# Patient Record
Sex: FEMALE | Race: BLACK OR AFRICAN AMERICAN | NOT HISPANIC OR LATINO | URBAN - METROPOLITAN AREA
[De-identification: names, ages, dates, MRNs, and addresses within clinical notes are randomized per-mention and may not be internally consistent; named-entity substitution may affect disease eponyms.]

---

## 2017-07-19 ENCOUNTER — EMERGENCY (EMERGENCY)
Facility: HOSPITAL | Age: 32
LOS: 1 days | Discharge: HOME | End: 2017-07-19
Admitting: EMERGENCY MEDICINE
Payer: MEDICAID

## 2017-07-19 VITALS
DIASTOLIC BLOOD PRESSURE: 80 MMHG | SYSTOLIC BLOOD PRESSURE: 132 MMHG | HEART RATE: 87 BPM | RESPIRATION RATE: 16 BRPM | OXYGEN SATURATION: 100 % | TEMPERATURE: 98 F

## 2017-07-19 VITALS
RESPIRATION RATE: 18 BRPM | SYSTOLIC BLOOD PRESSURE: 141 MMHG | TEMPERATURE: 98 F | HEART RATE: 73 BPM | DIASTOLIC BLOOD PRESSURE: 84 MMHG | OXYGEN SATURATION: 99 %

## 2017-07-19 PROCEDURE — 93010 ELECTROCARDIOGRAM REPORT: CPT

## 2017-07-19 PROCEDURE — 99284 EMERGENCY DEPT VISIT MOD MDM: CPT | Mod: 25

## 2017-07-19 RX ORDER — OXYMETAZOLINE HYDROCHLORIDE 0.5 MG/ML
3 SPRAY NASAL ONCE
Qty: 0 | Refills: 0 | Status: COMPLETED | OUTPATIENT
Start: 2017-07-19 | End: 2017-07-19

## 2017-07-19 RX ORDER — PSEUDOEPHEDRINE HCL 30 MG
1 TABLET ORAL
Qty: 28 | Refills: 0 | OUTPATIENT
Start: 2017-07-19 | End: 2017-07-26

## 2017-07-19 RX ORDER — PSEUDOEPHEDRINE HCL 30 MG
30 TABLET ORAL ONCE
Qty: 0 | Refills: 0 | Status: COMPLETED | OUTPATIENT
Start: 2017-07-19 | End: 2017-07-19

## 2017-07-19 RX ADMIN — Medication 30 MILLIGRAM(S): at 17:46

## 2017-07-19 RX ADMIN — OXYMETAZOLINE HYDROCHLORIDE 3 SPRAY(S): 0.5 SPRAY NASAL at 17:46

## 2017-07-19 NOTE — ED PROVIDER NOTE - CARE PLAN
Principal Discharge DX:	Allergic sinusitis Principal Discharge DX:	Allergic sinusitis  Instructions for follow-up, activity and diet:	Follow up with your Primary Medical Doctor within 2-3days. FOLLOW up with allergist. If results or reports were given to you, show copies of your reports given to you. Use Afrin nasal spray for no more than 3 days. Take pseudoephedrine as prescribed.  Take all of your medications as previously prescribed. Principal Discharge DX:	Allergic sinusitis  Instructions for follow-up, activity and diet:	Follow up with your Primary Medical Doctor within 2-3days. FOLLOW up with allergist. If results or reports were given to you, show copies of your reports given to you. Use Afrin nasal spray for no more than 3 days. Take pseudoephedrine as prescribed.  Take all of your medications as previously prescribed. If worsening symptoms, loss of vision, fever, headache, chest pain or shortness of breath return to the ED.

## 2017-07-19 NOTE — ED PROVIDER NOTE - OBJECTIVE STATEMENT
32 yo female with PMHx of HTN, anemia, seasonal allergies presents to the ED c/o sinus pressure and pain that is putting pressure on her eyes. Pt has seen her PMD was given Claritin, and flonase which has not helped. Pt also c/o intermittent mild shoulder pain, when she moves her shoulder/arm the wrong way. Denies ha, blurry vision, loss of vision, sob, cp, fever, chills, nausea, vomiting, abdominal pain, 32 yo female with PMHx of HTN, anemia, seasonal allergies presents to the ED c/o sinus pressure and pain that is putting pressure on her eyes. Pt has seen her PMD was given Claritin, and flonase which has not helped. Pt also c/o intermittent mild shoulder pain, when she moves her shoulder/arm the wrong way, no current shoudler pain, has not tried taking NSAIDs for pain. Denies ha, blurry vision, loss of vision, sob, cp, fever, chills, nausea, vomiting, abdominal pain, 32 yo female with PMHx of HTN, anemia, seasonal allergies presents to the ED c/o sinus pressure and pain that is putting pressure on her eyes x 2-3 weeks. Pt has seen her PMD was given Claritin, and flonase which has not helped. Pt also c/o intermittent mild shoulder pain, when she moves her shoulder/arm the wrong way, no current shoudler pain, has not tried taking NSAIDs for pain. Denies ha, blurry vision, loss of vision, sob, cp, fever, chills, nausea, vomiting, abdominal pain,

## 2017-07-19 NOTE — ED ADULT TRIAGE NOTE - CHIEF COMPLAINT QUOTE
states" I am having pain to my left shoulder and to left arm pit and by shoulder blade since almost 1 week , with pain and pressure to left eye

## 2017-07-19 NOTE — ED PROVIDER NOTE - CHPI ED SYMPTOMS NEG
no blurred vision/no weakness/no numbness/no syncope/no nausea/no loss of consciousness/no chills/no vomiting/no fever

## 2017-07-19 NOTE — ED PROVIDER NOTE - PROGRESS NOTE DETAILS
CORTEZ Monique: Pt feeling better with medications . Will discharge home with pmd and allergist follow up.

## 2018-12-21 NOTE — ED PROVIDER NOTE - MEDICAL DECISION MAKING DETAILS
Patient has difficulty to fall asleep at night.  She stated that she sometimes not able to fall asleep after lying in bed for 2-3 hours.  Patient did not want to take prescription medication.  She preferred to try to sleep naturally.  However she has difficulty to train her mind to calm down and sleep if she works overtime.  Patient requests to have [facility] letter for her anxiety and insomnia.  
Patient presented to clinic for discussion of her anxiety and sleep problems.  Patient reported that she has generalized anxiety disorder with worries all the time.  She is not able to sleep at night due to her over thinking and worry.  She has that problem over a year.  She does not want to take medication for her anxiety and insomnia as she wants to try naturally to control her mood.  She never has history of depression or bipolar or panic attack.  She denies suicidal ideation or plan or homicidal ideation or plan.  She stated that her previous PCP provides a work letter for her not to work overtime due to her anxiety and high stress.    Patient stated that she feels stressed if she works more than 8 hours a day and she has more anxiety attack if she works overtime.  She is not able to have enough rest and sleep if she works overtime.  Her body will be extremely tired and exhausted.  
30 yo female with sinus pressure, has had in the past due to allergies.   Likely allergic sinusitis. Will give afrin and pseudoephedrine and reassess. will send home with pmd follow up and allergist referral.

## 2023-10-21 NOTE — ED PROVIDER NOTE - PLAN OF CARE
Follow up with your Primary Medical Doctor within 2-3days. FOLLOW up with allergist. If results or reports were given to you, show copies of your reports given to you. Use Afrin nasal spray for no more than 3 days. Take pseudoephedrine as prescribed.  Take all of your medications as previously prescribed. Follow up with your Primary Medical Doctor within 2-3days. FOLLOW up with allergist. If results or reports were given to you, show copies of your reports given to you. Use Afrin nasal spray for no more than 3 days. Take pseudoephedrine as prescribed.  Take all of your medications as previously prescribed. If worsening symptoms, loss of vision, fever, headache, chest pain or shortness of breath return to the ED. Staged Advancement Flap Text: The defect edges were debeveled with a #15 scalpel blade. Given the location of the defect, shape of the defect and the proximity to free margins a staged advancement flap was deemed most appropriate. Using a sterile surgical marker, an appropriate advancement flap was drawn incorporating the defect and placing the expected incisions within the relaxed skin tension lines where possible. The area thus outlined was incised deep to adipose tissue with a #15 scalpel blade. The skin margins were undermined to an appropriate distance in all directions utilizing iris scissors. Following this, the designed flap was carried over into the primary defect and sutured into place.

## 2024-03-19 NOTE — ED PROVIDER NOTE - DATE/TIME 1
Christin Curtis at [facility] and sent over clinical and a facesheet. She stated that she will look over the paperwork and call us back.    19-Jul-2017 18:25